# Patient Record
Sex: MALE | Race: WHITE | NOT HISPANIC OR LATINO | Employment: UNEMPLOYED | URBAN - METROPOLITAN AREA
[De-identification: names, ages, dates, MRNs, and addresses within clinical notes are randomized per-mention and may not be internally consistent; named-entity substitution may affect disease eponyms.]

---

## 2017-11-10 ENCOUNTER — ALLSCRIPTS OFFICE VISIT (OUTPATIENT)
Dept: OTHER | Facility: OTHER | Age: 22
End: 2017-11-10

## 2017-11-10 DIAGNOSIS — K52.9 NONINFECTIVE GASTROENTERITIS AND COLITIS: ICD-10-CM

## 2017-11-10 DIAGNOSIS — Z13.1 ENCOUNTER FOR SCREENING FOR DIABETES MELLITUS: ICD-10-CM

## 2017-11-10 DIAGNOSIS — Z13.220 ENCOUNTER FOR SCREENING FOR LIPOID DISORDERS: ICD-10-CM

## 2017-11-10 DIAGNOSIS — Z11.3 ENCOUNTER FOR SCREENING FOR INFECTIONS WITH PREDOMINANTLY SEXUAL MODE OF TRANSMISSION: ICD-10-CM

## 2017-11-10 DIAGNOSIS — Z00.00 ENCOUNTER FOR GENERAL ADULT MEDICAL EXAMINATION WITHOUT ABNORMAL FINDINGS: ICD-10-CM

## 2018-01-11 NOTE — PROGRESS NOTES
Assessment    1  Encounter for preventive health examination (V70 0) (Z00 00)   2  Need for influenza vaccination (V04 81) (Z23)   3  Need for HPV vaccination (V04 89) (Z23)   4  Screen for STD (sexually transmitted disease) (V74 5) (Z11 3)   5  Chronic diarrhea of unknown origin (787 91) (K52 9)   6  Diabetes mellitus screening (V77 1) (Z13 1)   7  Screening for HIV (human immunodeficiency virus) (V73 89) (Z11 4)   8  Screening for lipoid disorders (V77 91) (Z13 220)    Plan  Chronic diarrhea of unknown origin    · (1) C  DIFFICILE TOXIN BY PCR; Status:Active; Requested for:10Nov2017;    · (1) CELIAC DISEASE AB PROFILE; Status:Active; Requested for:10Nov2017;    · (Q) CULTURE, STOOL, SAL/SHIG/CAMPY AND SHIGA TOXINS EIA W/RFL E COLI  O157 CULT; Status:Active; Requested for:10Nov2017;    · 1 - Amelia Coffey MD, Sushil Montague (Gastroenterology) Co-Management  *Pt is a former heroine  addict (clean for 2yrs) and currently smokes 1-2gm of marijuana/daily  Possible IBS -  but would appreciate GI c/s  Status: Active  Requested for: 00QPI0528  Care Summary provided  : Yes  Diabetes mellitus screening    · (1) BASIC METABOLIC PROFILE; Status:Active; Requested for:10Nov2017; Health Maintenance    · Follow-up visit in 1 month Evaluation and Treatment  Follow-up  Status: Hold For -  Scheduling  Requested for: 41NKW4671   · Begin a limited exercise program ; Status:Complete;   Done: 35PRD1585   · Eat a low fat and low cholesterol diet ; Status:Complete;   Done: 11JYP8485   · There are many ways to reduce your risk of catching or spreading a sexually transmitted  Infection ; Status:Complete;   Done: 21YIJ9847   · Using a latex condom can help prevent pregnancy   It can also help to prevent the spread  of sexually transmitted infections ; Status:Complete;   Done: 01XZR5841   · We recommend that you change your eating habits slowly ; Status:Complete;   Done:  11JFM1409  Health Maintenance, Screening for lipoid disorders    · (1) LIPID PANEL, FASTING; Status:Active; Requested for:10Nov2017;   Need for HPV vaccination    · Gardasil 9 Intramuscular Suspension  Need for influenza vaccination    · Fluzone Quadrivalent Intramuscular Suspension  Screen for STD (sexually transmitted disease)    · (1) ACUTE HEPATITIS PANEL; Status:Active; Requested for:10Nov2017;    · (1) CHLAMYDIA/GC AMPLIFIED DNA, PCR; Source:Urine, Unspecified Source;  Status:Active; Requested for:10Nov2017;    · (1) RPR; Status:Active; Requested for:10Nov2017;    · (Q) HIV AB, HIV 1/HIV 2, WESTERN BLOT/IMMUNOBLOT; [Do Not Release]; Status:Active; Requested for:10Nov2017;     Discussion/Summary  20yo M presented to the office to establish care and for an annual exam   1) Annual   - discussed diet/exercise  - discussed marijuana smoking cessation   - discussed safe sex practices - advised to always use condoms, STD screening labs given (HIV, RPR, Hep Panel, Chlamydia/GC)   - screening labs given - BMP, Lipids   - did get a flu vaccine and #1/3 gardasil vaccine - RTO in 1month for RN visit for #2/3  - RTO in 1month to f/u on labs   2) Chronic diarrhea   - has been on going for 1yr-1 5yrs   - sounds like IBS but stool studies advised and script given   - referral given for GI - appreciate GI input   Impression: health maintenance visit  Currently, he eats a poor diet and has an inadequate exercise regimen  Testing was done today for chlamydia, gonorrhea and HIV  Screening lab work includes glucose and lipid profile  The risks and benefits of immunizations were discussed, immunizations will be given as outlined in the orders and received Flu and Gardasil #1 of 3  Advice and education were given regarding nutrition, aerobic exercise, reproductive health and alcohol use  Educational resources provided: The treatment plan was reviewed with the patient/guardian   The patient/guardian understands and agrees with the treatment plan      Chief Complaint  New pt, preventative, Complains for the last yr and a half he has had stomach cramping and diarrhea 4 times a day      History of Present Illness  HPI: 20yo  M presents to the office for an annual exam and to establish care  - prior PCP was while pt was in Rehab March-June 2016 - heroine and xanax addiction - have been clean for 2yrs   - currently smokes 1-2grams of marijuana/day - gets it from a dispensary in [place]  - Prior PCP = Dr Victorino Castellanos (peds)   - PMHx: Osteoporosis, Carpal Tunnel   - allergies: pollen (swelling)   - Meds: Fleta Redhead   - PSHx: none   - FHx: M (degenerative bone disease), D (HTN, obesity)  - Immunizations: needs flu and gardasil   - diet/exercise: does not exercise, "do not eat well"   - social: no tob; 1-2 shots of vodka or gin daily, former heroine addict (has been clean for 2yrs), currently smokes 1-2grams of MJ daily   - sexual Hx: sexually active with M and F; does not use condoms, on ave has 2-3 partners/month  - last vision/dental: has a prescription for glasses/contacts but does not use them; "been a while"  - ROS: today in the office pt denies F/C/N/V/HA/visual changes/CP/palpitations/SOB/wheezing/constipation/urinary incontinence/numbness or tingling in b/l UE+LE/LE edema or calf tenderness  - is "hot headed", no SI/HI  2) Abd pain  - ongoing for the past year/year and a half  - bad cramps that can last up to 30secs   - diarrhea 3-4x/day x1yr; but no constipation   - no blood in stools, no hemorrhoids, no rectal bleeding   - (+) anal leakage  - tried to avoid lactose but did not do anything for his symptoms   - has not tried any OTCs for this   - does have some component of anxiety - self medicates with marijuana      Review of Systems    ROS reviewed   as noted in HPI      Past Medical History    · History of Heroin addiction (304 00) (F11 20)    Surgical History    · Denied: History Of Prior Surgery    Family History  Mother    · Denied: Family history of mental disorder   · Family history of osteoarthritis (V17 89) (Z82 69)   · Denied: Family history of substance abuse  Father    · Denied: Family history of mental disorder   · Denied: Family history of substance abuse  Paternal Grandmother    · Family history of myocardial infarction (V17 3) (Z82 49)    Social History    · Alcohol use (V49 89) (Z78 9)   · drinks 1-2 shots of vodka/gin QHS   · History of heroin use (V11 8) (Z86 59)   · has been clean for 2yrs   · Denied: History of Tobacco use   · Uses marijuana (305 20) (F12 90)   · smokes 1-2grams/day    Current Meds   1  Zyrtec 10 MG TABS; Therapy: (Recorded:10Nov2017) to Recorded    Allergies    1  No Known Drug Allergies    2  Pollen    Vitals   Recorded: 01WPF8578 10:00AM   Heart Rate 81   Respiration 16   Systolic 762   Diastolic 80   Height 6 ft 0 5 in   Weight 253 lb    BMI Calculated 33 84   BSA Calculated 2 37   O2 Saturation 99     Physical Exam    Constitutional   General appearance: No acute distress, well appearing and well nourished  obese  Head and Face   Head and face: Normal     Palpation of the face and sinuses: No sinus tenderness  Eyes   Conjunctiva and lids: No erythema, swelling or discharge  Pupils and irises: Equal, round, reactive to light  Ears, Nose, Mouth, and Throat   External inspection of ears and nose: Normal     Otoscopic examination: Tympanic membranes translucent with normal light reflex  Canals patent without erythema  Nasal mucosa, septum, and turbinates: Normal without edema or erythema  Pulmonary   Respiratory effort: No increased work of breathing or signs of respiratory distress  Auscultation of lungs: Clear to auscultation  no rales or crackles were heard bilaterally  no rhonchi  no wheezing  Cardiovascular   Auscultation of heart: Normal rate and rhythm, normal S1 and S2, no murmurs  The heart rate was normal  The rhythm was regular  Heart sounds: normal S1 and normal S2  no murmurs were heard  Abdomen   Abdomen: Abnormal   The abdomen was obese   Bowel sounds were normal  Skin findings: striae  There was mild tenderness that was diffuse  The abdomen was not rigid  No rebound tenderness  No guarding  no masses palpated  Musculoskeletal   Range of motion: Normal     Muscle strength/tone: Normal   Motor Strength Findings: normal strength  Neurologic   Cranial nerves: Cranial nerves 2-12 intact  Psychiatric   Orientation to person, place and time: Normal     Mood and affect: Normal   Mood and Affect: appropriate mood and appropriate affect        Procedure    Procedure:   Results: 20/20/50 in the right eye without corrective device, 20/30 in the left eye without corrective device      Future Appointments    Date/Time Provider Specialty Site   12/13/2017 10:00 AM Sara Meier, [address] [facility]   Electronically signed by : Enrico Hernandez DO; Nov 10 2017  2:55PM EST                       (Author)

## 2018-01-13 VITALS
RESPIRATION RATE: 16 BRPM | DIASTOLIC BLOOD PRESSURE: 80 MMHG | HEIGHT: 73 IN | HEART RATE: 81 BPM | WEIGHT: 253 LBS | OXYGEN SATURATION: 99 % | SYSTOLIC BLOOD PRESSURE: 118 MMHG | BODY MASS INDEX: 33.53 KG/M2

## 2018-08-01 ENCOUNTER — OFFICE VISIT (OUTPATIENT)
Dept: FAMILY MEDICINE CLINIC | Facility: CLINIC | Age: 23
End: 2018-08-01
Payer: COMMERCIAL

## 2018-08-01 ENCOUNTER — TELEPHONE (OUTPATIENT)
Dept: FAMILY MEDICINE CLINIC | Facility: CLINIC | Age: 23
End: 2018-08-01

## 2018-08-01 VITALS
OXYGEN SATURATION: 98 % | BODY MASS INDEX: 33 KG/M2 | HEIGHT: 73 IN | SYSTOLIC BLOOD PRESSURE: 128 MMHG | DIASTOLIC BLOOD PRESSURE: 72 MMHG | RESPIRATION RATE: 16 BRPM | WEIGHT: 249 LBS | HEART RATE: 72 BPM

## 2018-08-01 DIAGNOSIS — F90.9 ATTENTION DEFICIT HYPERACTIVITY DISORDER (ADHD), UNSPECIFIED ADHD TYPE: ICD-10-CM

## 2018-08-01 DIAGNOSIS — F10.11 HISTORY OF ALCOHOL ABUSE: Primary | ICD-10-CM

## 2018-08-01 PROBLEM — K52.9 CHRONIC DIARRHEA OF UNKNOWN ORIGIN: Status: ACTIVE | Noted: 2017-11-10

## 2018-08-01 PROBLEM — F31.63 BIPOLAR DISORDER, CURR EPISODE MIXED, SEVERE, W/O PSYCHOTIC FEATURES (HCC): Status: ACTIVE | Noted: 2018-02-08

## 2018-08-01 PROBLEM — M81.0 OSTEOPOROSIS: Status: ACTIVE | Noted: 2018-02-08

## 2018-08-01 PROCEDURE — 3008F BODY MASS INDEX DOCD: CPT | Performed by: FAMILY MEDICINE

## 2018-08-01 PROCEDURE — 99214 OFFICE O/P EST MOD 30 MIN: CPT | Performed by: FAMILY MEDICINE

## 2018-08-01 RX ORDER — ATOMOXETINE 40 MG/1
40 CAPSULE ORAL DAILY
Qty: 30 CAPSULE | Refills: 0 | Status: SHIPPED | OUTPATIENT
Start: 2018-08-01

## 2018-08-01 RX ORDER — ATOMOXETINE 40 MG/1
40 CAPSULE ORAL DAILY
COMMUNITY
End: 2018-08-01 | Stop reason: SDUPTHER

## 2018-08-01 NOTE — TELEPHONE ENCOUNTER
Pt was seen in the office tonight to follow up on a recent rehab visit    Pt states that while he was in rehab he was prescribed an ADHD medication and is requesting for this to be refilled  I tried calling [employer] and was advised by the nurse that since he is discharged that we can only get information from medical records    I left a message for medical records to please return our call  Dr Ferdinand Leyden is requesting records from his rehab visit to confirm his diagnosis before she refills his medication   If they do not call us back can someone please give them a call tomorrow when the medical records department is open  [phone]

## 2018-08-01 NOTE — PROGRESS NOTES
Assessment/Plan:  No problem-specific Assessment & Plan notes found for this encounter  Diagnoses and all orders for this visit:  History of alcohol abuse  - advised to get records from [facility]   - had MA call and leave a msg  - advised to find Psychiatrist/Addiction specialist and to take that October appt   - advised to go to NA meetings  - will have Care Coordinator touch base with him in 2wks     Attention deficit hyperactivity disorder (ADHD), unspecified ADHD type  - atoMOXetine (STRATTERA) 40 mg capsule; Take 1 capsule (40 mg total) by mouth daily  - discussed that will fill x30days as a courtesy   - pt really needs to follow-up with Psych - pt aware and agreeable        Subjective:    Patient ID: Acacia Ugalde  is a 25 y o  male  18yo M presents to the office for f/u s/p rehab  - was at [facility] [place] for 3wks for EtOH rehab from 6/11-6/29/2018   - has been out of his Strattera 40mg QD for the past 2wks - prescribed by Dr Irma Bingham   - was advised to get a Psychiatrist - has not done so as earliest appt he could find was in October   - was advised to find a local NA group - has not done so   - is already in IOP and does not have transportation   - has to pay $4000 in fees to get out of probation   - [phone] ([facility])   - denies F/C/N/V/CP/palpitations/SOB/wheezing/abd pain  - feels like Strattera helps him to stay focused       The following portions of the patient's history were reviewed and updated as appropriate: allergies, current medications, past family history, past medical history, past social history, past surgical history and problem list     Review of Systems  as per HPI     Objective:  /72 (BP Location: Left arm, Patient Position: Sitting, Cuff Size: Standard)   Pulse 72   Resp 16   Ht 6' 1" (1 854 m)   Wt 113 kg (249 lb)   SpO2 98%   BMI 32 85 kg/m²    Physical Exam   Constitutional: He is oriented to person, place, and time   He appears well-developed and well-nourished  No distress  HENT:   Head: Normocephalic and atraumatic  Eyes: Conjunctivae and EOM are normal  Right eye exhibits no discharge  Left eye exhibits no discharge  No scleral icterus  Neck: Normal range of motion  Cardiovascular: Normal rate, regular rhythm and normal heart sounds  Exam reveals no gallop and no friction rub  No murmur heard  Pulmonary/Chest: Effort normal and breath sounds normal  No respiratory distress  He has no wheezes  He has no rales  Abdominal: Soft  Bowel sounds are normal    Musculoskeletal: Normal range of motion  Neurological: He is alert and oriented to person, place, and time  Skin: Skin is warm  He is not diaphoretic  Psychiatric: He has a normal mood and affect  His behavior is normal  Judgment and thought content normal    Vitals reviewed

## 2018-08-02 NOTE — TELEPHONE ENCOUNTER
Received form for patient to sign so we can request records from [facility]  Pt states he will be by to sign it  I put the form in the drawer at the

## 2020-03-20 ENCOUNTER — VBI (OUTPATIENT)
Dept: ADMINISTRATIVE | Facility: OTHER | Age: 25
End: 2020-03-20